# Patient Record
Sex: MALE | Race: OTHER | URBAN - METROPOLITAN AREA
[De-identification: names, ages, dates, MRNs, and addresses within clinical notes are randomized per-mention and may not be internally consistent; named-entity substitution may affect disease eponyms.]

---
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
## 2020-09-18 NOTE — ED ADULT TRIAGE NOTE - HEART RATE (BEATS/MIN)
LIQUID NITROGEN INSTRUCTIONS    Today we treated your actinic keratosis with liquid nitrogen.    Freezing with liquid nitrogen is accompanied by a stinging, burning sensation which usually lasts from 15 minutes up to several hours.     It is normal for a blister to sometimes form at the treatment site.  Occasionally this will be a blood blister.  It is usually best to leave the blister unopened.  The blister normally breaks in the first few days, but on the fingers it can last longer.  If it is painful, it can be opened after cleansing the area with soap and water followed by rubbing alcohol.  A needle that has been sterilized with a match and then wiped clean with rubbing alcohol can be used to open the blister.  Wash hands before opening the blister. The blister may form into a dry crust.  The crust should peel off in 2-4 weeks.  There may be some minimal redness after the crust falls off, but this should fade with time.     It is okay to wash, shampoo, shower or apply cosmetics to the area, but the area should not be rubbed as this can irritate it.  Apply Vaseline (petroleum jelly)  to the treated area if it is raw.    Possible side effects: risk of permanent skin color change (lighter or darker skin), persistence, blister, crusting, discomfort, infection.    Please call if any questions or concerns. We can be reached at:        [facility]: [phone]  [facility]: [phone]    Faby Snow MD           66

## 2022-04-23 NOTE — PRE-OP CHECKLIST - WEIGHT IN LBS
No new care gaps identified.  Powered by [employer]. Reference number: [id number].   4/23/2022 5:31:36 AM CDT   149.9

## 2022-07-17 ENCOUNTER — HOSPITAL ENCOUNTER (EMERGENCY)
Facility: HOSPITAL | Age: 22
Discharge: HOME | End: 2022-07-17
Payer: MEDICARE

## 2022-07-17 ENCOUNTER — APPOINTMENT (OUTPATIENT)
Dept: URGENT CARE | Age: 22
End: 2022-07-17
Payer: OTHER MISCELLANEOUS

## 2022-07-17 PROCEDURE — 99284 EMERGENCY DEPT VISIT MOD MDM: CPT

## 2022-07-17 PROCEDURE — G0383 LEV 4 HOSP TYPE B ED VISIT: HCPCS

## 2022-08-04 ENCOUNTER — APPOINTMENT (OUTPATIENT)
Dept: URGENT CARE | Age: 22
End: 2022-08-04
Payer: OTHER MISCELLANEOUS

## 2022-08-04 ENCOUNTER — APPOINTMENT (OUTPATIENT)
Dept: RADIOLOGY | Age: 22
End: 2022-08-04
Payer: OTHER MISCELLANEOUS

## 2022-08-04 DIAGNOSIS — S39.92XA INJURY OF LOW BACK, INITIAL ENCOUNTER: Primary | ICD-10-CM

## 2022-08-04 DIAGNOSIS — S39.012A STRAIN OF LUMBAR REGION, INITIAL ENCOUNTER: ICD-10-CM

## 2022-08-04 DIAGNOSIS — S39.92XA INJURY OF LOW BACK, INITIAL ENCOUNTER: ICD-10-CM

## 2022-08-04 PROCEDURE — 99213 OFFICE O/P EST LOW 20 MIN: CPT | Performed by: NURSE PRACTITIONER

## 2022-08-04 PROCEDURE — 72100 X-RAY EXAM L-S SPINE 2/3 VWS: CPT

## 2022-08-11 ENCOUNTER — APPOINTMENT (OUTPATIENT)
Dept: URGENT CARE | Age: 22
End: 2022-08-11
Payer: OTHER MISCELLANEOUS

## 2022-08-11 PROCEDURE — 99213 OFFICE O/P EST LOW 20 MIN: CPT | Performed by: NURSE PRACTITIONER

## 2022-09-08 ENCOUNTER — APPOINTMENT (OUTPATIENT)
Dept: URGENT CARE | Age: 22
End: 2022-09-08
Payer: OTHER MISCELLANEOUS

## 2022-09-08 PROCEDURE — 99214 OFFICE O/P EST MOD 30 MIN: CPT | Performed by: PREVENTIVE MEDICINE

## 2022-10-06 ENCOUNTER — APPOINTMENT (OUTPATIENT)
Dept: URGENT CARE | Age: 22
End: 2022-10-06
Payer: OTHER MISCELLANEOUS

## 2022-10-06 PROCEDURE — 99213 OFFICE O/P EST LOW 20 MIN: CPT | Performed by: PREVENTIVE MEDICINE

## 2024-12-24 ENCOUNTER — APPOINTMENT (EMERGENCY)
Dept: RADIOLOGY | Facility: HOSPITAL | Age: 24
End: 2024-12-24

## 2024-12-24 ENCOUNTER — HOSPITAL ENCOUNTER (EMERGENCY)
Facility: HOSPITAL | Age: 24
Discharge: HOME | End: 2024-12-24
Attending: EMERGENCY MEDICINE

## 2024-12-24 VITALS
TEMPERATURE: 98.5 F | HEART RATE: 90 BPM | DIASTOLIC BLOOD PRESSURE: 56 MMHG | WEIGHT: 160.72 LBS | SYSTOLIC BLOOD PRESSURE: 130 MMHG | OXYGEN SATURATION: 97 % | RESPIRATION RATE: 16 BRPM

## 2024-12-24 DIAGNOSIS — R68.89 FLU-LIKE SYMPTOMS: Primary | ICD-10-CM

## 2024-12-24 DIAGNOSIS — U07.1 COVID: ICD-10-CM

## 2024-12-24 LAB
FLUAV AG UPPER RESP QL IA.RAPID: NEGATIVE
FLUBV AG UPPER RESP QL IA.RAPID: NEGATIVE
SARS-COV+SARS-COV-2 AG RESP QL IA.RAPID: POSITIVE

## 2024-12-24 PROCEDURE — 71045 X-RAY EXAM CHEST 1 VIEW: CPT

## 2024-12-24 PROCEDURE — 87811 SARS-COV-2 COVID19 W/OPTIC: CPT | Performed by: EMERGENCY MEDICINE

## 2024-12-24 PROCEDURE — 87804 INFLUENZA ASSAY W/OPTIC: CPT | Performed by: EMERGENCY MEDICINE

## 2024-12-24 PROCEDURE — 99284 EMERGENCY DEPT VISIT MOD MDM: CPT | Performed by: EMERGENCY MEDICINE

## 2024-12-24 PROCEDURE — 99283 EMERGENCY DEPT VISIT LOW MDM: CPT

## 2024-12-24 RX ORDER — ACETAMINOPHEN 325 MG/1
975 TABLET ORAL ONCE
Status: COMPLETED | OUTPATIENT
Start: 2024-12-24 | End: 2024-12-24

## 2024-12-24 RX ORDER — IBUPROFEN 600 MG/1
600 TABLET, FILM COATED ORAL ONCE
Status: COMPLETED | OUTPATIENT
Start: 2024-12-24 | End: 2024-12-24

## 2024-12-24 RX ADMIN — ACETAMINOPHEN 975 MG: 325 TABLET, FILM COATED ORAL at 22:54

## 2024-12-24 RX ADMIN — IBUPROFEN 600 MG: 600 TABLET, FILM COATED ORAL at 22:54

## 2024-12-25 NOTE — ED PROVIDER NOTES
Time reflects when diagnosis was documented in both MDM as applicable and the Disposition within this note       Time User Action Codes Description Comment    12/24/2024 11:06 PM Vania Mataaz Add [R68.89] Flu-like symptoms     12/24/2024 11:32 PM Abraham Mata Add [U07.1] COVID           ED Disposition       ED Disposition   Discharge    Condition   Stable    Date/Time   Tue Dec 24, 2024 11:32 PM    Comment   Keven Salas discharge to home/self care.                   Assessment & Plan       Medical Decision Making  Patient is a 24-year-old male, no significant past medical history, comes in with complaints of cough with yellow phlegm, congestion, bilateral lower rib pain, body aches, no shortness of breath, no vomiting, no headache, no abdominal pain, no back pain. On exam, patient is conscious, alert, stable vitals, no acute distress, no increased work of breathing, lungs clear, heart sounds regular.  Differential diagnosis: Flu type illness, viral URI, colitis, pneumonia, COVID flu swab, chest x-ray, give Tylenol Motrin.    Problems Addressed:  COVID: acute illness or injury  Flu-like symptoms: acute illness or injury    Amount and/or Complexity of Data Reviewed  Labs: ordered. Decision-making details documented in ED Course.  Radiology: ordered and independent interpretation performed. Decision-making details documented in ED Course.    Risk  OTC drugs.  Prescription drug management.        ED Course as of 12/24/24 2350   Tue Dec 24, 2024   2302 XR chest 1 view portable  Chest x-ray independently reviewed, no significant acute abnormality noted.   2331 SARS COV Rapid Antigen(!): Positive  COVID + noted.       Medications   acetaminophen (TYLENOL) tablet 975 mg (975 mg Oral Given 12/24/24 2254)   ibuprofen (MOTRIN) tablet 600 mg (600 mg Oral Given 12/24/24 2254)       ED Risk Strat Scores                          SBIRT 20yo+      Flowsheet Row Most Recent Value   Initial Alcohol Screen: US AUDIT-C     1. How  often do you have a drink containing alcohol? 2 Filed at: 12/24/2024 2231   2. How many drinks containing alcohol do you have on a typical day you are drinking?  1 Filed at: 12/24/2024 2231   3a. Male UNDER 65: How often do you have five or more drinks on one occasion? 0 Filed at: 12/24/2024 2231   3b. FEMALE Any Age, or MALE 65+: How often do you have 4 or more drinks on one occassion? 0 Filed at: 12/24/2024 2231   Audit-C Score 3 Filed at: 12/24/2024 2231   GRISELDA: How many times in the past year have you...    Used an illegal drug or used a prescription medication for non-medical reasons? Never Filed at: 12/24/2024 2231                            History of Present Illness       Chief Complaint   Patient presents with    Flu Symptoms     Pt complains of body aches, chills, productive cough with yellow phlegm, and back pain x1 day. No meds PTA.       History reviewed. No pertinent past medical history.   History reviewed. No pertinent surgical history.   History reviewed. No pertinent family history.       E-Cigarette/Vaping      E-Cigarette/Vaping Substances      I have reviewed and agree with the history as documented.       History provided by:  Patient   used: No    Flu Symptoms  Presenting symptoms: no cough, no diarrhea, no fever, no headaches, no nausea, no shortness of breath, no sore throat and no vomiting    Severity:  Moderate  Onset quality:  Gradual  Duration:  2 days  Progression:  Waxing and waning  Chronicity:  New  Relieved by:  Nothing  Worsened by:  Nothing  Ineffective treatments:  None tried  Associated symptoms: no chills and no neck stiffness    Risk factors comment:  None      Review of Systems   Constitutional:  Negative for chills and fever.   HENT:  Negative for facial swelling, sore throat and trouble swallowing.    Eyes:  Negative for pain and visual disturbance.   Respiratory:  Negative for cough, chest tightness and shortness of breath.    Cardiovascular:  Negative  for chest pain and leg swelling.   Gastrointestinal:  Negative for abdominal pain, diarrhea, nausea and vomiting.   Genitourinary:  Negative for dysuria and flank pain.   Musculoskeletal:  Negative for back pain, neck pain and neck stiffness.   Skin:  Negative for pallor and rash.   Allergic/Immunologic: Negative for environmental allergies and immunocompromised state.   Neurological:  Negative for dizziness and headaches.   Hematological:  Negative for adenopathy. Does not bruise/bleed easily.   Psychiatric/Behavioral:  Negative for agitation and behavioral problems.    All other systems reviewed and are negative.          Objective       ED Triage Vitals [12/24/24 2233]   Temperature Pulse Blood Pressure Respirations SpO2 Patient Position - Orthostatic VS   98.5 °F (36.9 °C) 90 130/56 16 97 % Sitting      Temp Source Heart Rate Source BP Location FiO2 (%) Pain Score    Oral Monitor Right arm -- 6      Vitals      Date and Time Temp Pulse SpO2 Resp BP Pain Score FACES Pain Rating User   12/24/24 2254 -- -- -- -- -- 7 -- [place]   12/24/24 2233 98.5 °F (36.9 °C) 90 97 % 16 130/56 6 -- AB            Physical Exam  Vitals and nursing note reviewed.   Constitutional:       General: He is not in acute distress.     Appearance: He is well-developed.   HENT:      Head: Normocephalic and atraumatic.   Eyes:      Extraocular Movements: Extraocular movements intact.   Cardiovascular:      Rate and Rhythm: Normal rate and regular rhythm.      Heart sounds: Normal heart sounds.   Pulmonary:      Effort: Pulmonary effort is normal.      Breath sounds: Normal breath sounds.   Abdominal:      Palpations: Abdomen is soft.      Tenderness: There is no abdominal tenderness. There is no guarding or rebound.   Musculoskeletal:         General: Normal range of motion.      Cervical back: Normal range of motion and neck supple.   Skin:     General: Skin is warm and dry.   Neurological:      General: No focal deficit present.      Mental  Status: He is alert and oriented to person, place, and time.   Psychiatric:         Mood and Affect: Mood normal.         Behavior: Behavior normal.         Results Reviewed       Procedure Component Value Units Date/Time    FLU/COVID Rapid Antigen (30 min. TAT) - Preferred screening test in ED [[id number]]  (Abnormal) Collected: 12/24/24 2255    Lab Status: Final result Specimen: Nares from Nose Updated: 12/24/24 2315     SARS COV Rapid Antigen Positive     Influenza A Rapid Antigen Negative     Influenza B Rapid Antigen Negative    Narrative:      This test has been performed using the [employer] Julia 2 FLU+SARS Antigen test under the Emergency Use Authorization (EUA). This test has been validated by the  and verified by the performing laboratory. The Julia uses lateral flow immunofluorescent sandwich assay to detect SARS-COV, Influenza A and Influenza B Antigen.     The [employer] 2 SARS Antigen test does not differentiate between SARS-CoV and SARS-CoV-2.     Negative results are presumptive and may be confirmed with a molecular assay, if necessary, for patient management. Negative results do not rule out SARS-CoV-2 or influenza infection and should not be used as the sole basis for treatment or patient management decisions. A negative test result may occur if the level of antigen in a sample is below the limit of detection of this test.     Positive results are indicative of the presence of viral antigens, but do not rule out bacterial infection or co-infection with other viruses.     All test results should be used as an adjunct to clinical observations and other information available to the provider.    FOR PEDIATRIC PATIENTS - copy/paste COVID Guidelines URL to browser: [email or web address]            XR chest 1 view portable    (Results Pending)       Procedures    ED Medication and Procedure Management   None     There are no discharge  medications for this patient.    No discharge procedures on file.  ED SEPSIS DOCUMENTATION   Time reflects when diagnosis was documented in both MDM as applicable and the Disposition within this note       Time User Action Codes Description Comment    12/24/2024 11:06 PM Abraham Mata [R68.89] Flu-like symptoms     12/24/2024 11:32 PM Abraham Mata [U07.1] COVID                  Abraham Mata MD  12/24/24 0360

## 2025-07-29 ENCOUNTER — APPOINTMENT (OUTPATIENT)
Dept: URGENT CARE | Age: 25
End: 2025-07-29